# Patient Record
(demographics unavailable — no encounter records)

---

## 2024-11-11 NOTE — PHYSICAL EXAM
[No Acute Distress] : no acute distress [No JVD] : no jugular venous distention [No Respiratory Distress] : no respiratory distress  [No Accessory Muscle Use] : no accessory muscle use [Clear to Auscultation] : lungs were clear to auscultation bilaterally [Normal Rate] : normal rate  [Regular Rhythm] : with a regular rhythm [No Edema] : there was no peripheral edema [No Focal Deficits] : no focal deficits [Normal Affect] : the affect was normal

## 2024-11-13 NOTE — HISTORY OF PRESENT ILLNESS
[FreeTextEntry2] : Discharge note from [facility] in patient with end-stage renal disease on peritoneal dialysis at home sent by dialysis center to the ER on October 9, 2024 secondary to severe anemia with hemoglobin at 6.1. Stool positive for blood. Received 2 unit packed red blood cells in the ER. Peritoneal dialysis transition to hemodialysis with right forearm permacath placement 10/9. Seen by hematology on 10/10. Received another unit of packed red blood cells during hemodialysis on 10/12. 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 Patient is stable for discharge on October 16 with outpatient follow-up with hematology and onsite dialysis at subacute rehab.